# Patient Record
Sex: FEMALE | Race: WHITE | NOT HISPANIC OR LATINO | Employment: FULL TIME | URBAN - METROPOLITAN AREA
[De-identification: names, ages, dates, MRNs, and addresses within clinical notes are randomized per-mention and may not be internally consistent; named-entity substitution may affect disease eponyms.]

---

## 2019-05-02 PROBLEM — F90.9 ATTENTION DEFICIT HYPERACTIVITY DISORDER: Status: ACTIVE | Noted: 2018-08-20

## 2019-05-02 PROBLEM — C53.9 MALIGNANT TUMOR OF CERVIX: Status: ACTIVE | Noted: 2018-08-20

## 2019-05-02 PROBLEM — D64.9 ANEMIA: Status: ACTIVE | Noted: 2018-08-20

## 2019-05-02 PROBLEM — B18.2 CHRONIC HEPATITIS C: Status: ACTIVE | Noted: 2018-08-20

## 2019-05-07 LAB
CHLAMYDIA NUCLEIC ACID SCREEN: NORMAL
GC NUCLEIC ACID SCREEN: NORMAL

## 2019-05-22 LAB — CYTOLOGY: NORMAL

## 2019-08-29 PROBLEM — R87.612 LOW GRADE SQUAMOUS INTRAEPITHELIAL LESION ON CYTOLOGIC SMEAR OF CERVIX (LGSIL): Status: ACTIVE | Noted: 2019-08-29

## 2019-09-03 PROBLEM — F14.91 HISTORY OF COCAINE USE: Status: ACTIVE | Noted: 2019-09-03

## 2019-09-03 PROBLEM — D64.9 ANEMIA: Status: RESOLVED | Noted: 2018-08-20 | Resolved: 2019-09-03

## 2019-09-03 PROBLEM — F33.1 MAJOR DEPRESSIVE DISORDER, RECURRENT EPISODE, MODERATE: Status: ACTIVE | Noted: 2019-09-03

## 2019-09-03 PROBLEM — C53.9 MALIGNANT TUMOR OF CERVIX: Status: RESOLVED | Noted: 2018-08-20 | Resolved: 2019-09-03

## 2019-09-03 PROBLEM — O23.41 URINARY TRACT INFECTION IN MOTHER DURING FIRST TRIMESTER OF PREGNANCY: Status: ACTIVE | Noted: 2019-09-03

## 2019-09-03 PROBLEM — Z72.0 TOBACCO USE: Status: ACTIVE | Noted: 2019-09-03

## 2019-09-03 PROBLEM — O09.521 ELDERLY MULTIGRAVIDA IN FIRST TRIMESTER: Status: ACTIVE | Noted: 2019-09-03

## 2019-09-03 PROBLEM — O09.91 SUPERVISION OF HIGH RISK PREGNANCY IN FIRST TRIMESTER: Status: ACTIVE | Noted: 2019-09-03

## 2019-09-03 PROBLEM — F15.91 HISTORY OF METHAMPHETAMINE USE: Status: ACTIVE | Noted: 2019-09-03

## 2019-09-03 PROBLEM — Z87.898 HISTORY OF INTRAVENOUS DRUG USE IN REMISSION: Status: ACTIVE | Noted: 2019-09-03

## 2019-09-03 PROBLEM — F12.90 MARIJUANA USE: Status: ACTIVE | Noted: 2019-09-03

## 2019-09-03 PROBLEM — F15.10 METHAMPHETAMINE USE: Status: ACTIVE | Noted: 2019-09-03

## 2021-05-12 ENCOUNTER — PATIENT MESSAGE (OUTPATIENT)
Dept: RESEARCH | Facility: HOSPITAL | Age: 36
End: 2021-05-12

## 2023-10-06 ENCOUNTER — HOSPITAL ENCOUNTER (EMERGENCY)
Facility: HOSPITAL | Age: 38
Discharge: HOME | End: 2023-10-07
Attending: EMERGENCY MEDICINE
Payer: MEDICAID

## 2023-10-06 DIAGNOSIS — R07.9 CHEST PAIN: ICD-10-CM

## 2023-10-06 DIAGNOSIS — T50.901A ACCIDENTAL DRUG OVERDOSE, INITIAL ENCOUNTER: ICD-10-CM

## 2023-10-06 DIAGNOSIS — S29.9XXA CHEST TRAUMA, INITIAL ENCOUNTER: ICD-10-CM

## 2023-10-06 DIAGNOSIS — R07.89 ATYPICAL CHEST PAIN: ICD-10-CM

## 2023-10-06 DIAGNOSIS — T40.601A OPIATE OVERDOSE, ACCIDENTAL OR UNINTENTIONAL, INITIAL ENCOUNTER: Primary | ICD-10-CM

## 2023-10-06 PROCEDURE — 93010 ELECTROCARDIOGRAM REPORT: CPT | Mod: ,,, | Performed by: INTERNAL MEDICINE

## 2023-10-06 PROCEDURE — 99285 EMERGENCY DEPT VISIT HI MDM: CPT | Mod: 25

## 2023-10-06 PROCEDURE — 93010 EKG 12-LEAD: ICD-10-PCS | Mod: ,,, | Performed by: INTERNAL MEDICINE

## 2023-10-06 PROCEDURE — 93005 ELECTROCARDIOGRAM TRACING: CPT

## 2023-10-07 VITALS
HEIGHT: 63 IN | DIASTOLIC BLOOD PRESSURE: 65 MMHG | TEMPERATURE: 98 F | WEIGHT: 121.06 LBS | HEART RATE: 90 BPM | SYSTOLIC BLOOD PRESSURE: 110 MMHG | OXYGEN SATURATION: 97 % | RESPIRATION RATE: 20 BRPM | BODY MASS INDEX: 21.45 KG/M2

## 2023-10-07 LAB
ALBUMIN SERPL BCP-MCNC: 4.2 G/DL (ref 3.5–5.2)
ALP SERPL-CCNC: 44 U/L (ref 55–135)
ALT SERPL W/O P-5'-P-CCNC: 15 U/L (ref 10–44)
ANION GAP SERPL CALC-SCNC: 11 MMOL/L (ref 8–16)
AST SERPL-CCNC: 28 U/L (ref 10–40)
BASOPHILS # BLD AUTO: 0.05 K/UL (ref 0–0.2)
BASOPHILS NFR BLD: 0.4 % (ref 0–1.9)
BILIRUB SERPL-MCNC: 0.4 MG/DL (ref 0.1–1)
BUN SERPL-MCNC: 12 MG/DL (ref 6–20)
CALCIUM SERPL-MCNC: 8.9 MG/DL (ref 8.7–10.5)
CHLORIDE SERPL-SCNC: 109 MMOL/L (ref 95–110)
CO2 SERPL-SCNC: 22 MMOL/L (ref 23–29)
CREAT SERPL-MCNC: 0.7 MG/DL (ref 0.5–1.4)
DIFFERENTIAL METHOD: ABNORMAL
EOSINOPHIL # BLD AUTO: 0.1 K/UL (ref 0–0.5)
EOSINOPHIL NFR BLD: 0.7 % (ref 0–8)
ERYTHROCYTE [DISTWIDTH] IN BLOOD BY AUTOMATED COUNT: 13.6 % (ref 11.5–14.5)
EST. GFR  (NO RACE VARIABLE): >60 ML/MIN/1.73 M^2
GLUCOSE SERPL-MCNC: 74 MG/DL (ref 70–110)
HCT VFR BLD AUTO: 42.8 % (ref 37–48.5)
HGB BLD-MCNC: 13.6 G/DL (ref 12–16)
IMM GRANULOCYTES # BLD AUTO: 0.08 K/UL (ref 0–0.04)
IMM GRANULOCYTES NFR BLD AUTO: 0.6 % (ref 0–0.5)
LYMPHOCYTES # BLD AUTO: 1.3 K/UL (ref 1–4.8)
LYMPHOCYTES NFR BLD: 9.3 % (ref 18–48)
MCH RBC QN AUTO: 25.4 PG (ref 27–31)
MCHC RBC AUTO-ENTMCNC: 31.8 G/DL (ref 32–36)
MCV RBC AUTO: 80 FL (ref 82–98)
MONOCYTES # BLD AUTO: 0.8 K/UL (ref 0.3–1)
MONOCYTES NFR BLD: 5.6 % (ref 4–15)
NEUTROPHILS # BLD AUTO: 11.6 K/UL (ref 1.8–7.7)
NEUTROPHILS NFR BLD: 83.4 % (ref 38–73)
NRBC BLD-RTO: 0 /100 WBC
PLATELET # BLD AUTO: 279 K/UL (ref 150–450)
PMV BLD AUTO: 8.9 FL (ref 9.2–12.9)
POTASSIUM SERPL-SCNC: 4.3 MMOL/L (ref 3.5–5.1)
PROT SERPL-MCNC: 7.5 G/DL (ref 6–8.4)
RBC # BLD AUTO: 5.36 M/UL (ref 4–5.4)
SODIUM SERPL-SCNC: 142 MMOL/L (ref 136–145)
WBC # BLD AUTO: 13.91 K/UL (ref 3.9–12.7)

## 2023-10-07 PROCEDURE — 25000003 PHARM REV CODE 250: Performed by: EMERGENCY MEDICINE

## 2023-10-07 PROCEDURE — 80053 COMPREHEN METABOLIC PANEL: CPT | Performed by: EMERGENCY MEDICINE

## 2023-10-07 PROCEDURE — 85025 COMPLETE CBC W/AUTO DIFF WBC: CPT | Performed by: EMERGENCY MEDICINE

## 2023-10-07 PROCEDURE — 63600175 PHARM REV CODE 636 W HCPCS: Performed by: EMERGENCY MEDICINE

## 2023-10-07 PROCEDURE — 96360 HYDRATION IV INFUSION INIT: CPT

## 2023-10-07 PROCEDURE — S4991 NICOTINE PATCH NONLEGEND: HCPCS | Performed by: EMERGENCY MEDICINE

## 2023-10-07 RX ORDER — IBUPROFEN 200 MG
1 TABLET ORAL
Status: DISCONTINUED | OUTPATIENT
Start: 2023-10-07 | End: 2023-10-07 | Stop reason: HOSPADM

## 2023-10-07 RX ORDER — NALOXONE HYDROCHLORIDE 4 MG/.1ML
SPRAY NASAL
Qty: 1 EACH | Refills: 11 | Status: SHIPPED | OUTPATIENT
Start: 2023-10-07

## 2023-10-07 RX ADMIN — SODIUM CHLORIDE, POTASSIUM CHLORIDE, SODIUM LACTATE AND CALCIUM CHLORIDE 1000 ML: 600; 310; 30; 20 INJECTION, SOLUTION INTRAVENOUS at 12:10

## 2023-10-07 RX ADMIN — NICOTINE 1 PATCH: 21 PATCH, EXTENDED RELEASE TRANSDERMAL at 01:10

## 2023-10-07 NOTE — ED PROVIDER NOTES
"SCRIBE #1 NOTE: I, Kelvin Iniguez, am scribing for, and in the presence of, Brett Hillman MD. I have scribed the entire note.       History     Chief Complaint   Patient presents with    Drug Overdose     Friend called 911 after patient went unresponsive after using heroin. 4mg Narcan given and 1 round of CPR. + response. Pt AAO now and reports she had been clean for 15 years prior to today.      HPI  10/6/2023, 11:40 PM  History obtained from the patient    HPI:  Rajani Baumann is a 38 y.o. female with a PMH of ADHD, drug abuse, Hep B, Hep C, anxiety, and mental disorder who presents to the [facility] emergency department after an overdose which occurred PTA. Pt was sober for 15 years but decided to use Heroin intravenously through her neck. She went unresponsive, and her friend called 911. Enroute, she was given 4 mg of Narcan and 1 round of CPR with a positive response. Associated symptoms include CP and diaphoresis. Exacerbating factors: n/a. At time of interview, pt is alert, awake, and oriented. She notes that she is from [place]. No other complaints or concerns.     Arrival mode: Ambulance Service     An independent history was also taken from EMS due to the patient's condition, who added: history information.      PCP: [facility]    Review of patient's allergies indicates:   Allergen Reactions    Buspirone Other (See Comments)     "Feel electricity throughout my body"      Opioids - morphine analogues Nausea Only      Past Medical History:   Diagnosis Date    Abnormal Pap smear of cervix 04/2019    ADHD     Anxiety disorder     Bipolar disorder     H/O drug abuse     Hepatitis B     Hepatitis C     Mental disorder      Past Surgical History:   Procedure Laterality Date    CERVICAL BIOPSY  W/ LOOP ELECTRODE EXCISION         No family history on file.  Social History     Tobacco Use    Smoking status: Every Day     Current packs/day: 0.04     Average packs/day: (0.1 ttl pk-yrs)     " Types: Cigarettes    Smokeless tobacco: Never   Substance and Sexual Activity    Alcohol use: Yes     Alcohol/week: 1.0 standard drink of alcohol     Types: 1 Glasses of wine per week    Drug use: Not Currently     Types: Heroin, Marijuana     Comment: Last usage 15 years ago    Sexual activity: Not Currently     Birth control/protection: None      Review of Systems     Review of Systems   Constitutional:  Positive for diaphoresis.   HENT: Negative.     Eyes: Negative.    Respiratory: Negative.     Cardiovascular:  Positive for chest pain.   Gastrointestinal: Negative.    Endocrine: Negative.    Genitourinary: Negative.    Musculoskeletal: Negative.    Skin: Negative.    Allergic/Immunologic: Negative.    Neurological:  Positive for syncope.   Hematological: Negative.    Psychiatric/Behavioral: Negative.     All other systems reviewed and are negative.         Physical Exam     Initial Vitals [10/06/23 2229]   BP Pulse Resp Temp SpO2   125/85 98 18 98.4 °F (36.9 °C) 97 %      MAP       --          Physical Exam  Nursing notes and vital signs reviewed.  Constitutional: Patient is in no distress.   Head: Normocephalic. Atraumatic.   Eyes: Conjunctivae are not pale. No scleral icterus. Pupils midsize.   ENT: Mucous membranes moist.   Neck: Supple.   Cardiovascular: Regular rate. Regular rhythm. No m/r/g. Anterior chest wall tenderness.   Pulmonary: No respiratory distress. CTAB  Abdominal: Non-distended.   Musculoskeletal: Moves all extremities. No obvious deformities. No edema.   Skin: Diaphoretic.   Neurological:  Alert, awake, and appropriate. Normal speech. No acute lateralizing neurologic deficits appreciated.   Psychiatric: Normal affect.      ED Course   Critical Care    Date/Time: 10/6/2023 11:49 PM    Performed by: Brett Hillman MD  Authorized by: Brett Hillman MD  Direct patient critical care time: 10 minutes  Additional history critical care time: 4 minutes  Ordering / reviewing critical care  "time: 6 minutes  Documentation critical care time: 4 minutes  Consulting other physicians critical care time: 4 minutes  Consult with family critical care time: 4 minutes  Total critical care time (exclusive of procedural time) : 32 minutes  Critical care time was exclusive of separately billable procedures and treating other patients and teaching time.  Critical care was necessary to treat or prevent imminent or life-threatening deterioration of the following conditions: toxidrome.  Critical care was time spent personally by me on the following activities: blood draw for specimens, development of treatment plan with patient or surrogate, discussions with consultants, interpretation of cardiac output measurements, evaluation of patient's response to treatment, examination of patient, obtaining history from patient or surrogate, ordering and performing treatments and interventions, ordering and review of laboratory studies, ordering and review of radiographic studies, pulse oximetry, re-evaluation of patient's condition and review of old charts.        Vitals:    10/06/23 2229 10/06/23 2309 10/06/23 2334 10/07/23 0134   BP: 125/85 116/78 117/85 (!) 107/57   Pulse: 98 99 90 87   Resp: 18 19 14 16   Temp: 98.4 °F (36.9 °C)      TempSrc: Oral      SpO2: 97% 96% 97%    Weight:  54.9 kg (121 lb 0.5 oz)     Height: 5' 3" (1.6 m)       10/07/23 0245   BP: 110/65   Pulse: 90   Resp: 20   Temp: 98.4 °F (36.9 °C)   TempSrc: Oral   SpO2: 97%   Weight:    Height:      Lab Results Interpreted as Abnormal:  Labs Reviewed   CBC W/ AUTO DIFFERENTIAL - Abnormal; Notable for the following components:       Result Value    WBC 13.91 (*)     MCV 80 (*)     MCH 25.4 (*)     MCHC 31.8 (*)     MPV 8.9 (*)     Immature Granulocytes 0.6 (*)     Gran # (ANC) 11.6 (*)     Immature Grans (Abs) 0.08 (*)     Gran % 83.4 (*)     Lymph % 9.3 (*)     All other components within normal limits   COMPREHENSIVE METABOLIC PANEL - Abnormal; Notable for " the following components:    CO2 22 (*)     Alkaline Phosphatase 44 (*)     All other components within normal limits      All Lab Results:  Results for orders placed or performed during the hospital encounter of 10/06/23   CBC auto differential   Result Value Ref Range    WBC 13.91 (H) 3.90 - 12.70 K/uL    RBC 5.36 4.00 - 5.40 M/uL    Hemoglobin 13.6 12.0 - 16.0 g/dL    Hematocrit 42.8 37.0 - 48.5 %    MCV 80 (L) 82 - 98 fL    MCH 25.4 (L) 27.0 - 31.0 pg    MCHC 31.8 (L) 32.0 - 36.0 g/dL    RDW 13.6 11.5 - 14.5 %    Platelets 279 150 - 450 K/uL    MPV 8.9 (L) 9.2 - 12.9 fL    Immature Granulocytes 0.6 (H) 0.0 - 0.5 %    Gran # (ANC) 11.6 (H) 1.8 - 7.7 K/uL    Immature Grans (Abs) 0.08 (H) 0.00 - 0.04 K/uL    Lymph # 1.3 1.0 - 4.8 K/uL    Mono # 0.8 0.3 - 1.0 K/uL    Eos # 0.1 0.0 - 0.5 K/uL    Baso # 0.05 0.00 - 0.20 K/uL    nRBC 0 0 /100 WBC    Gran % 83.4 (H) 38.0 - 73.0 %    Lymph % 9.3 (L) 18.0 - 48.0 %    Mono % 5.6 4.0 - 15.0 %    Eosinophil % 0.7 0.0 - 8.0 %    Basophil % 0.4 0.0 - 1.9 %    Differential Method Automated    Comprehensive metabolic panel   Result Value Ref Range    Sodium 142 136 - 145 mmol/L    Potassium 4.3 3.5 - 5.1 mmol/L    Chloride 109 95 - 110 mmol/L    CO2 22 (L) 23 - 29 mmol/L    Glucose 74 70 - 110 mg/dL    BUN 12 6 - 20 mg/dL    Creatinine 0.7 0.5 - 1.4 mg/dL    Calcium 8.9 8.7 - 10.5 mg/dL    Total Protein 7.5 6.0 - 8.4 g/dL    Albumin 4.2 3.5 - 5.2 g/dL    Total Bilirubin 0.4 0.1 - 1.0 mg/dL    Alkaline Phosphatase 44 (L) 55 - 135 U/L    AST 28 10 - 40 U/L    ALT 15 10 - 44 U/L    eGFR >60 >60 mL/min/1.73 m^2    Anion Gap 11 8 - 16 mmol/L     Imaging Results              X-Ray Chest AP Portable (Final result)  Result time 10/07/23 00:03:48      Final result by Ashish Isaacs MD (10/07/23 00:03:48)                   Impression:      No acute abnormality.      Electronically signed by: Ashish Isaacs  Date:    10/07/2023  Time:    00:03               Narrative:     EXAMINATION:  XR CHEST AP PORTABLE    CLINICAL HISTORY:  Unspecified injury of thorax, initial encounter    TECHNIQUE:  Single frontal view of the chest was performed.    COMPARISON:  Multiple priors.    FINDINGS:  The lungs are clear, with normal appearance of pulmonary vasculature and no pleural effusion or pneumothorax.    The cardiac silhouette is normal in size. The hilar and mediastinal contours are unremarkable.    Bones are intact.                                     ED Physician's independent review of the above imaging: agree with radiologist    The EKG was ordered, reviewed, and independently interpreted by the ED Physician:  Interpretation time: 23:30  Rate: 85 bpm  Rhythm: normal sinus rhythm  Interpretation: No acute ST changes. No STEMI.      The emergency physician reviewed the vital signs and test results, which are outlined above.     ED Discussion     1:15 AM: Patient's evaluation in the ED does not suggest any emergent or life-threatening medical conditions requiring immediate intervention beyond what was provided in the ED, and I believe patient is safe for discharge.  Regardless, an unremarkable evaluation in the ED does not preclude the development or presence of a serious or life-threatening condition. As such, patient was given return instructions for any change or worsening of symptoms.     Medical Decision Making:   Clinical Tests:   Lab Tests: Ordered and Reviewed  Radiological Study: Ordered and Reviewed  Medical Tests: Ordered and Reviewed         ED Medication(s) Administered:  Medications   lactated ringers bolus 1,000 mL (0 mLs Intravenous Stopped 10/7/23 0104)       Prescription Management: I performed a review of the patient's current Rx medication list as input by nursing staff.    Discharge Medication List as of 10/7/2023  1:14 AM        START taking these medications    Details   naloxone (NARCAN) 4 mg/actuation Spry 4mg by nasal route as needed for opioid overdose; may repeat  every 2-3 minutes in alternating nostrils until medical help arrives. Call 911, Print           CONTINUE these medications which have NOT CHANGED    Details   amoxicillin (AMOXIL) 500 MG capsule Take 1 capsule (500 mg total) by mouth 3 (three) times daily., Starting Mon 3/7/2022, Print      gabapentin (NEURONTIN) 300 MG capsule Take 300 mg by mouth 3 (three) times daily., Historical Med      HYDROcodone-acetaminophen (NORCO) 5-325 mg per tablet Take 1 tablet by mouth every 6 (six) hours as needed for Pain., Starting Mon 3/7/2022, Print      ibuprofen (ADVIL,MOTRIN) 600 MG tablet Take 1 tablet (600 mg total) by mouth every 6 (six) hours as needed for Pain., Starting Mon 3/7/2022, Print      ondansetron (ZOFRAN) 4 MG tablet Take 1 tablet (4 mg total) by mouth every 6 (six) hours., Starting Sat 3/5/2022, Print      ondansetron (ZOFRAN-ODT) 4 MG TbDL Take 1 tablet (4 mg total) by mouth 2 (two) times daily., Starting Mon 3/7/2022, Print      prenatal vit-iron fum-folic ac (PRENATAL VITAMIN) 27 mg iron- 0.8 mg Tab Take 1 tablet by mouth once daily., Starting Tue 9/3/2019, Until Wed 9/2/2020, OTC      sertraline (ZOLOFT) 100 MG tablet Take 1 tablet (100 mg total) by mouth once daily., Starting Thu 5/2/2019, Until Fri 5/1/2020, Normal                 Follow-up Information       Schedule an appointment as soon as possible for a visit  with your primary care doctor.               [facility]..    Specialty: Emergency Medicine  Why: As needed, If symptoms worsen  Contact information:  [address] [facility] [address] [phone]                           Scribe Attestation:   Scribe #1: I performed the above scribed service and the documentation accurately describes the services I performed. I attest to the accuracy of the note.     Attending:   Physician Attestation Statement for Scribe #1: I, Brett Hillman MD, personally performed the services described in this documentation,  as scribed by Kelvin Iniguez, in my presence, and it is both accurate and complete. As with other dictation methods such as dictation software, small errors or inconsistencies may be overlooked due to the goal of spending more face-to-face time with patients.      Clinical Impression       ICD-10-CM ICD-9-CM   1. Opiate overdose, accidental or unintentional, initial encounter  T40.601A 965.00     E850.2   2. Chest pain  R07.9 786.50   3. Chest trauma, initial encounter  S29.9XXA 959.11   4. Atypical chest pain  R07.89 786.59   5. Accidental drug overdose, initial encounter  T50.901A 977.9     E858.9      ED Disposition Condition    Discharge Stable               Brett Hillman MD  10/08/23 0122